# Patient Record
Sex: FEMALE | Race: WHITE | NOT HISPANIC OR LATINO | Employment: UNEMPLOYED | URBAN - METROPOLITAN AREA
[De-identification: names, ages, dates, MRNs, and addresses within clinical notes are randomized per-mention and may not be internally consistent; named-entity substitution may affect disease eponyms.]

---

## 2018-03-05 ENCOUNTER — TELEPHONE (OUTPATIENT)
Dept: CARDIOLOGY | Facility: MEDICAL CENTER | Age: 33
End: 2018-03-05

## 2018-03-05 ENCOUNTER — OFFICE VISIT (OUTPATIENT)
Dept: CARDIOLOGY | Facility: MEDICAL CENTER | Age: 33
End: 2018-03-05
Payer: MEDICAID

## 2018-03-05 VITALS
BODY MASS INDEX: 43.19 KG/M2 | HEIGHT: 60 IN | SYSTOLIC BLOOD PRESSURE: 130 MMHG | WEIGHT: 220 LBS | HEART RATE: 100 BPM | DIASTOLIC BLOOD PRESSURE: 80 MMHG

## 2018-03-05 DIAGNOSIS — I25.10 CORONARY ARTERY DISEASE INVOLVING NATIVE CORONARY ARTERY OF NATIVE HEART WITHOUT ANGINA PECTORIS: ICD-10-CM

## 2018-03-05 DIAGNOSIS — Z95.1 HX OF CABG: ICD-10-CM

## 2018-03-05 DIAGNOSIS — E78.5 DYSLIPIDEMIA: ICD-10-CM

## 2018-03-05 DIAGNOSIS — E11.65 TYPE 2 DIABETES MELLITUS WITH HYPERGLYCEMIA, WITHOUT LONG-TERM CURRENT USE OF INSULIN (HCC): ICD-10-CM

## 2018-03-05 DIAGNOSIS — I10 HTN (HYPERTENSION), MALIGNANT: ICD-10-CM

## 2018-03-05 PROCEDURE — 93000 ELECTROCARDIOGRAM COMPLETE: CPT | Performed by: INTERNAL MEDICINE

## 2018-03-05 PROCEDURE — 99205 OFFICE O/P NEW HI 60 MIN: CPT | Mod: 25 | Performed by: INTERNAL MEDICINE

## 2018-03-05 RX ORDER — CARVEDILOL 12.5 MG/1
12.5 TABLET ORAL 2 TIMES DAILY WITH MEALS
Qty: 60 TAB | Refills: 11 | Status: SHIPPED | OUTPATIENT
Start: 2018-03-05

## 2018-03-05 RX ORDER — ROSUVASTATIN CALCIUM 40 MG/1
40 TABLET, COATED ORAL DAILY
Qty: 30 TAB | Refills: 3 | Status: SHIPPED | OUTPATIENT
Start: 2018-03-05

## 2018-03-05 RX ORDER — ASPIRIN 325 MG
325 TABLET ORAL EVERY 6 HOURS PRN
COMMUNITY

## 2018-03-05 RX ORDER — LISINOPRIL 40 MG/1
40 TABLET ORAL DAILY
COMMUNITY

## 2018-03-05 RX ORDER — SPIRONOLACTONE 25 MG/1
25 TABLET ORAL DAILY
Qty: 30 TAB | Refills: 3 | Status: SHIPPED | OUTPATIENT
Start: 2018-03-05

## 2018-03-05 ASSESSMENT — ENCOUNTER SYMPTOMS
ORTHOPNEA: 0
FALLS: 0
DIZZINESS: 0
FEVER: 0
EYE DISCHARGE: 0
NAUSEA: 0
SHORTNESS OF BREATH: 0
HEADACHES: 0
COUGH: 0
CHILLS: 0
MYALGIAS: 0
DEPRESSION: 0
BRUISES/BLEEDS EASILY: 0
HALLUCINATIONS: 0
ABDOMINAL PAIN: 0
BLOOD IN STOOL: 0
PALPITATIONS: 0
DOUBLE VISION: 0
LOSS OF CONSCIOUSNESS: 0
WEIGHT LOSS: 0
EYE PAIN: 0
PND: 0
CLAUDICATION: 0
VOMITING: 0
SENSORY CHANGE: 0
BLURRED VISION: 0
SPEECH CHANGE: 0

## 2018-03-05 NOTE — TELEPHONE ENCOUNTER
PAR sent to [facility]:  RICKI LOCK (Key: [id number]) - [id number]   Status: Sent to Plan on March 5th, 2018  The plan will fax you a determination, typically within 1 to 5 business days.   Drug: Rosuvastatin Calcium 40MG tablets   Form: Prior Authorization Form for General Requests   Phone: [phone]   Fax: [phone]   Claim Rejection Details   Code: 75  Message: Prior Authorization Required     Status  Sent to [facility]  Next Steps  The plan will fax you a determination, typically within 1 to 5 business days.  How do I follow up?  DrugRosuvastatin Calcium 40MG tablets  Form[place] Medicaid General FormPrior Authorization Form for General [phone]  Original Claim Info75 Prior Authorization Required

## 2018-03-05 NOTE — LETTER
[facility]   [address]   [address]  [place] [address]  Phone: [phone]  Fax: [phone]              Maria C Brandon  1985    Encounter Date: 3/5/2018    Oanh Mclaughlin M.D.          PROGRESS NOTE:  Subjective:   Maria C Brandon is a 32 y.o. female who presents today for cardiac care and management of complicated cardiac problems. In July 2016, patient underwent CABG in [place]. Patient was told that her left ventricular systolic function was reduced possibly at 35%. We don't have any imaging studies to validate that. Patient is moving to the area and is seeking cardiac care.    Patient does not have any symptoms at this time. No shortness of breath no chest pain. She feels okay overall.    Chief Complaint: CAD s/p CABG, ischemic cardiomyopathy.    Past Medical History:   Diagnosis Date   • Hyperlipidemia      Past Surgical History:   Procedure Laterality Date   • AICD IMPLANT     • CARDIAC CATH     • MULTIPLE CORONARY ARTERY BYPASS       Family History   Problem Relation Age of Onset   • Heart Disease Mother    • Heart Disease Father    • Heart Attack Father    • Hyperlipidemia Father      History   Smoking Status   • Passive Smoke Exposure - Never Smoker   Smokeless Tobacco   • Never Used     No Known Allergies  Outpatient Encounter Prescriptions as of 3/5/2018   Medication Sig Dispense Refill   • lisinopril (PRINIVIL, ZESTRIL) 40 MG tablet Take 40 mg by mouth every day.     • aspirin (ASA) 325 MG Tab Take 325 mg by mouth every 6 hours as needed.     • carvedilol (COREG) 12.5 MG Tab Take 1 Tab by mouth 2 times a day, with meals. 60 Tab 11   • rosuvastatin (CRESTOR) 40 MG tablet Take 1 Tab by mouth every day. 30 Tab 3   • spironolactone (ALDACTONE) 25 MG Tab Take 1 Tab by mouth every day. 30 Tab 3     No facility-administered encounter medications on file as of 3/5/2018.      Review of Systems   Constitutional: Negative for  chills, fever, malaise/fatigue and weight loss.   HENT: Negative for ear discharge, ear pain, hearing loss and nosebleeds.    Eyes: Negative for blurred vision, double vision, pain and discharge.   Respiratory: Negative for cough and shortness of breath.    Cardiovascular: Negative for chest pain, palpitations, orthopnea, claudication, leg swelling and PND.   Gastrointestinal: Negative for abdominal pain, blood in stool, melena, nausea and vomiting.   Genitourinary: Negative for dysuria and hematuria.   Musculoskeletal: Negative for falls, joint pain and myalgias.   Skin: Negative for itching and rash.   Neurological: Negative for dizziness, sensory change, speech change, loss of consciousness and headaches.   Endo/Heme/Allergies: Negative for environmental allergies. Does not bruise/bleed easily.   Psychiatric/Behavioral: Negative for depression, hallucinations and suicidal ideas.        Objective:   /80   Pulse 100   Ht 1.524 m (5')   Wt 99.8 kg (220 lb)   BMI 42.97 kg/m²      Physical Exam   Constitutional: She is oriented to person, place, and time. She appears well-developed and well-nourished.   HENT:   Head: Normocephalic and atraumatic.   Eyes: EOM are normal.   Neck: No JVD present.   Cardiovascular: Normal rate, regular rhythm, normal heart sounds and intact distal pulses.  Exam reveals no gallop and no friction rub.    No murmur heard.  Pulmonary/Chest: No respiratory distress. She has no wheezes. She has no rales. She exhibits no tenderness.   Abdominal: She exhibits no distension. There is no tenderness. There is no rebound and no guarding.   Musculoskeletal: She exhibits no edema or tenderness.   Lymphadenopathy:     She has no cervical adenopathy.   Neurological: She is alert and oriented to person, place, and time.   Skin: Skin is dry.   Psychiatric: She has a normal mood and affect.   Nursing note and vitals reviewed.      Assessment:     1. Hx of CABG in 07/2016 in [place], [place]   ECHOCARDIOGRAM COMP W/O CONT    carvedilol (COREG) 12.5 MG Tab    rosuvastatin (CRESTOR) 40 MG tablet    COMP METABOLIC PANEL    LIPID PANEL    spironolactone (ALDACTONE) 25 MG Tab   2. HTN (hypertension), malignant  lisinopril (PRINIVIL, ZESTRIL) 40 MG tablet    aspirin (ASA) 325 MG Tab    ECHOCARDIOGRAM COMP W/O CONT    carvedilol (COREG) 12.5 MG Tab    rosuvastatin (CRESTOR) 40 MG tablet    COMP METABOLIC PANEL    LIPID PANEL    spironolactone (ALDACTONE) 25 MG Tab   3. Dyslipidemia  ECHOCARDIOGRAM COMP W/O CONT    carvedilol (COREG) 12.5 MG Tab    rosuvastatin (CRESTOR) 40 MG tablet    COMP METABOLIC PANEL    LIPID PANEL   4. Type 2 diabetes mellitus with hyperglycemia, without long-term current use of insulin (CMS-HCC)  ECHOCARDIOGRAM COMP W/O CONT    COMP METABOLIC PANEL    LIPID PANEL   5. Coronary artery disease involving native coronary artery of native heart without angina pectoris  ECHOCARDIOGRAM COMP W/O CONT    rosuvastatin (CRESTOR) 40 MG tablet    COMP METABOLIC PANEL    LIPID PANEL    spironolactone (ALDACTONE) 25 MG Tab       Medical Decision Making:  Today's Assessment / Status / Plan:   Today, based on physical examination findings, patient is euvolemic. No JVD, lungs are clear to auscultation, no pitting edema in bilateral lower extremities, no ascites.    Dry weight is 220 lbs.    We will obtain a transthoracic echocardiogram of her own to further assess LV function and cardiac valvular functions.    Restart Coreg 12.5 mg twice a day.    Restart Rosuvastatin (crestor) 40 mg once a day.    Will put patient on spironolactone 25 mg by mouth once a day as well.    Will continue to closely monitor for side effects of patient's high risk medication(s) including liver, renal function and electrolytes.    I will see patient back in 1 weeks.    I thank you Dr. Michelle for referring patient to our Heart Failure Clinic today.                Jonn Mason M.D.  [address] [place] [id number]  VIA  Facsimile: [phone]

## 2018-03-06 NOTE — PROGRESS NOTES
Subjective:   Maria C Brandon is a 32 y.o. female who presents today for cardiac care and management of complicated cardiac problems. In July 2016, patient underwent CABG in [place]. Patient was told that her left ventricular systolic function was reduced possibly at 35%. We don't have any imaging studies to validate that. Patient is moving to the area and is seeking cardiac care.    Patient does not have any symptoms at this time. No shortness of breath no chest pain. She feels okay overall.    Chief Complaint: CAD s/p CABG, ischemic cardiomyopathy.    Past Medical History:   Diagnosis Date   • Hyperlipidemia      Past Surgical History:   Procedure Laterality Date   • AICD IMPLANT     • CARDIAC CATH     • MULTIPLE CORONARY ARTERY BYPASS       Family History   Problem Relation Age of Onset   • Heart Disease Mother    • Heart Disease Father    • Heart Attack Father    • Hyperlipidemia Father      History   Smoking Status   • Passive Smoke Exposure - Never Smoker   Smokeless Tobacco   • Never Used     No Known Allergies  Outpatient Encounter Prescriptions as of 3/5/2018   Medication Sig Dispense Refill   • lisinopril (PRINIVIL, ZESTRIL) 40 MG tablet Take 40 mg by mouth every day.     • aspirin (ASA) 325 MG Tab Take 325 mg by mouth every 6 hours as needed.     • carvedilol (COREG) 12.5 MG Tab Take 1 Tab by mouth 2 times a day, with meals. 60 Tab 11   • rosuvastatin (CRESTOR) 40 MG tablet Take 1 Tab by mouth every day. 30 Tab 3   • spironolactone (ALDACTONE) 25 MG Tab Take 1 Tab by mouth every day. 30 Tab 3     No facility-administered encounter medications on file as of 3/5/2018.      Review of Systems   Constitutional: Negative for chills, fever, malaise/fatigue and weight loss.   HENT: Negative for ear discharge, ear pain, hearing loss and nosebleeds.    Eyes: Negative for blurred vision, double vision, pain and discharge.   Respiratory: Negative for cough and shortness of breath.    Cardiovascular: Negative for  chest pain, palpitations, orthopnea, claudication, leg swelling and PND.   Gastrointestinal: Negative for abdominal pain, blood in stool, melena, nausea and vomiting.   Genitourinary: Negative for dysuria and hematuria.   Musculoskeletal: Negative for falls, joint pain and myalgias.   Skin: Negative for itching and rash.   Neurological: Negative for dizziness, sensory change, speech change, loss of consciousness and headaches.   Endo/Heme/Allergies: Negative for environmental allergies. Does not bruise/bleed easily.   Psychiatric/Behavioral: Negative for depression, hallucinations and suicidal ideas.        Objective:   /80   Pulse 100   Ht 1.524 m (5')   Wt 99.8 kg (220 lb)   BMI 42.97 kg/m²     Physical Exam   Constitutional: She is oriented to person, place, and time. She appears well-developed and well-nourished.   HENT:   Head: Normocephalic and atraumatic.   Eyes: EOM are normal.   Neck: No JVD present.   Cardiovascular: Normal rate, regular rhythm, normal heart sounds and intact distal pulses.  Exam reveals no gallop and no friction rub.    No murmur heard.  Pulmonary/Chest: No respiratory distress. She has no wheezes. She has no rales. She exhibits no tenderness.   Abdominal: She exhibits no distension. There is no tenderness. There is no rebound and no guarding.   Musculoskeletal: She exhibits no edema or tenderness.   Lymphadenopathy:     She has no cervical adenopathy.   Neurological: She is alert and oriented to person, place, and time.   Skin: Skin is dry.   Psychiatric: She has a normal mood and affect.   Nursing note and vitals reviewed.      Assessment:     1. Hx of CABG in 07/2016 in [place], [place]  ECHOCARDIOGRAM COMP W/O CONT    carvedilol (COREG) 12.5 MG Tab    rosuvastatin (CRESTOR) 40 MG tablet    COMP METABOLIC PANEL    LIPID PANEL    spironolactone (ALDACTONE) 25 MG Tab   2. HTN (hypertension), malignant  lisinopril (PRINIVIL, ZESTRIL) 40 MG tablet    aspirin (ASA) 325 MG Tab     ECHOCARDIOGRAM COMP W/O CONT    carvedilol (COREG) 12.5 MG Tab    rosuvastatin (CRESTOR) 40 MG tablet    COMP METABOLIC PANEL    LIPID PANEL    spironolactone (ALDACTONE) 25 MG Tab   3. Dyslipidemia  ECHOCARDIOGRAM COMP W/O CONT    carvedilol (COREG) 12.5 MG Tab    rosuvastatin (CRESTOR) 40 MG tablet    COMP METABOLIC PANEL    LIPID PANEL   4. Type 2 diabetes mellitus with hyperglycemia, without long-term current use of insulin (CMS-[facility])  ECHOCARDIOGRAM COMP W/O CONT    COMP METABOLIC PANEL    LIPID PANEL   5. Coronary artery disease involving native coronary artery of native heart without angina pectoris  ECHOCARDIOGRAM COMP W/O CONT    rosuvastatin (CRESTOR) 40 MG tablet    COMP METABOLIC PANEL    LIPID PANEL    spironolactone (ALDACTONE) 25 MG Tab       Medical Decision Making:  Today's Assessment / Status / Plan:   Today, based on physical examination findings, patient is euvolemic. No JVD, lungs are clear to auscultation, no pitting edema in bilateral lower extremities, no ascites.    Dry weight is 220 lbs.    We will obtain a transthoracic echocardiogram of her own to further assess LV function and cardiac valvular functions.    Restart Coreg 12.5 mg twice a day.    Restart Rosuvastatin (crestor) 40 mg once a day.    Will put patient on spironolactone 25 mg by mouth once a day as well.    Will continue to closely monitor for side effects of patient's high risk medication(s) including liver, renal function and electrolytes.    I will see patient back in 1 weeks.    I thank you Dr. Michelle for referring patient to our Heart Failure Clinic today.

## 2018-03-07 LAB — EKG IMPRESSION: NORMAL

## 2018-06-06 ENCOUNTER — HOSPITAL ENCOUNTER (OUTPATIENT)
Dept: LAB | Facility: MEDICAL CENTER | Age: 33
End: 2018-06-06
Attending: INTERNAL MEDICINE
Payer: MEDICAID

## 2018-06-06 ENCOUNTER — HOSPITAL ENCOUNTER (OUTPATIENT)
Dept: CARDIOLOGY | Facility: MEDICAL CENTER | Age: 33
End: 2018-06-06
Attending: INTERNAL MEDICINE
Payer: MEDICAID

## 2018-06-06 DIAGNOSIS — E78.5 DYSLIPIDEMIA: ICD-10-CM

## 2018-06-06 DIAGNOSIS — Z95.1 HX OF CABG: ICD-10-CM

## 2018-06-06 DIAGNOSIS — I10 HTN (HYPERTENSION), MALIGNANT: ICD-10-CM

## 2018-06-06 DIAGNOSIS — I25.10 CORONARY ARTERY DISEASE INVOLVING NATIVE CORONARY ARTERY OF NATIVE HEART WITHOUT ANGINA PECTORIS: ICD-10-CM

## 2018-06-06 DIAGNOSIS — E11.65 TYPE 2 DIABETES MELLITUS WITH HYPERGLYCEMIA, WITHOUT LONG-TERM CURRENT USE OF INSULIN (HCC): ICD-10-CM

## 2018-06-06 LAB
ALBUMIN SERPL BCP-MCNC: 3.5 G/DL (ref 3.2–4.9)
ALBUMIN/GLOB SERPL: 0.9 G/DL
ALP SERPL-CCNC: 82 U/L (ref 30–99)
ALT SERPL-CCNC: 7 U/L (ref 2–50)
ANION GAP SERPL CALC-SCNC: 10 MMOL/L (ref 0–11.9)
AST SERPL-CCNC: 13 U/L (ref 12–45)
BILIRUB SERPL-MCNC: 0.3 MG/DL (ref 0.1–1.5)
BUN SERPL-MCNC: 13 MG/DL (ref 8–22)
CALCIUM SERPL-MCNC: 9.3 MG/DL (ref 8.5–10.5)
CHLORIDE SERPL-SCNC: 99 MMOL/L (ref 96–112)
CHOLEST SERPL-MCNC: 130 MG/DL (ref 100–199)
CO2 SERPL-SCNC: 25 MMOL/L (ref 20–33)
CREAT SERPL-MCNC: 0.89 MG/DL (ref 0.5–1.4)
GLOBULIN SER CALC-MCNC: 3.8 G/DL (ref 1.9–3.5)
GLUCOSE SERPL-MCNC: 357 MG/DL (ref 65–99)
HDLC SERPL-MCNC: 41 MG/DL
LDLC SERPL CALC-MCNC: 58 MG/DL
LV EJECT FRACT  99904: 55
LV EJECT FRACT MOD 2C 99903: 62.44
LV EJECT FRACT MOD 4C 99902: 53.83
LV EJECT FRACT MOD BP 99901: 56.35
POTASSIUM SERPL-SCNC: 4.9 MMOL/L (ref 3.6–5.5)
PROT SERPL-MCNC: 7.3 G/DL (ref 6–8.2)
SODIUM SERPL-SCNC: 134 MMOL/L (ref 135–145)
TRIGL SERPL-MCNC: 157 MG/DL (ref 0–149)

## 2018-06-06 PROCEDURE — 93306 TTE W/DOPPLER COMPLETE: CPT

## 2018-06-06 PROCEDURE — 36415 COLL VENOUS BLD VENIPUNCTURE: CPT

## 2018-06-06 PROCEDURE — 80053 COMPREHEN METABOLIC PANEL: CPT

## 2018-06-06 PROCEDURE — 80061 LIPID PANEL: CPT
